# Patient Record
Sex: MALE | Race: WHITE
[De-identification: names, ages, dates, MRNs, and addresses within clinical notes are randomized per-mention and may not be internally consistent; named-entity substitution may affect disease eponyms.]

---

## 2017-04-13 ENCOUNTER — HOSPITAL ENCOUNTER (OUTPATIENT)
Dept: HOSPITAL 56 - MW.CHORTHO | Age: 47
Discharge: HOME | End: 2017-04-13
Attending: ORTHOPAEDIC SURGERY
Payer: COMMERCIAL

## 2017-04-13 DIAGNOSIS — M25.511: Primary | ICD-10-CM

## 2017-04-13 NOTE — CR
EXAMINATION: Right shoulder

 

HISTORY: Pain

 

COMPARISON: None

 

TECHNIQUE: 3 views

 

FINDINGS/IMPRESSION: There is no acute osseous abnormality, dislocation, or fracture identified. Bon
e mineralization and joint spaces appear preserved.

## 2017-04-17 ENCOUNTER — HOSPITAL ENCOUNTER (OUTPATIENT)
Dept: HOSPITAL 56 - MW.MRI | Age: 47
End: 2017-04-17
Attending: ORTHOPAEDIC SURGERY
Payer: COMMERCIAL

## 2017-04-17 DIAGNOSIS — M75.81: ICD-10-CM

## 2017-04-17 DIAGNOSIS — M25.511: Primary | ICD-10-CM

## 2017-04-17 DIAGNOSIS — S43.491A: ICD-10-CM

## 2017-04-17 DIAGNOSIS — R74.0: ICD-10-CM

## 2017-04-17 LAB
CHLORIDE SERPL-SCNC: 103 MMOL/L (ref 98–110)
SODIUM SERPL-SCNC: 138 MMOL/L (ref 136–146)

## 2017-04-18 NOTE — MR
EXAM DATE: 17



PATIENT'S AGE: 46





Patient: FLORIDA HODGE



Facility: [place], [place]

Patient ID: [id number]

Site Patient ID: [id number].

Site Accession #: [id number]

: 1970

Study: MRI Shoulder Right [id number]/17/2017 6:45:43 PM

Ordering Physician: Preet Duran



Final Report: 

HISTORY:

Right shoulder pain. Pain with shoulder movement.



Technique:

MRI right shoulder without contrast.



Comparison:

None.



Findings:

Rotator cuff: Low-grade partial-thickness articular surface tearing of the 
supraspinatus tendon footplate. Tear measures 0.7 cm in AP dimension by 0.2 cm 
in medial-lateral dimension. Distal supraspinatus and infraspinatus tendons are 
otherwise thickened with heterogeneous intermediate to near fluid intensity 
signal. There may be low-grade articular surface fraying of the supraspinatus 
tendon medial to the insertion. Low grade partial-thickness bursal surface 
tearing of the subscapularis tendon over an area measuring 0.4 x 0.4 cm. 
Moderate subscapularis tendinosis. Teres minor tendon is intact.



Rotator cuff muscle bulk and signal intensity are within normal limits.



Acromioclavicular joint and coracoacromial arch: Mild AC joint degenerative 
changes. No inferior hypertrophy. Type 1 acromial morphology. Acromiohumeral 
interval measures 7 mm. Coracoacromial ligament is thickened near the acromial 
attachment. Coracoclavicular ligament is intact.



Biceps-labral complex: Long head biceps tendon is intact. No biceps tendon 
subluxation. Tearing of the superior, posterior-superior, and posterior-
inferior labrum. Small paralabral cysts along the posterior-superior posterior 
and inferior margin of the glenoid. No cyst extension into the suprascapular 
spinoglenoid notches. Anterior-inferior labrum is intact.



Glenohumeral joint: No focal cartilage defects. No joint effusion.



Bones and soft tissues: No fracture. No marrow replacing process. No subacromial
-subdeltoid bursal effusion. Deltoid muscle bulk and signal intensity are 
normal. No abnormality in the suprascapular or spinoglenoid notches or in the 
quadrilateral space. 



Impression:

1. No high-grade partial-thickness or full-thickness rotator cuff tear. Low-
grade partial-thickness articular surface tear of the subscapularis tendon at 
the insertion. Moderate-severe tendinosis versus strain related changes of the 
supraspinatus and infraspinatus tendons. 

2. Moderate subscapularis tendinosis with low-grade partial-thickness bursal 
surface tearing. 

3. Tear of the superior, posterior-superior, and posterior inferior labrum with 
small paralabral cysts.

4. Mild AC joint degenerative changes. 

5. Intact biceps tendon.



Dictated by Mark Motta MD @ 2017 9:44AM

(Electronic Signature)



Report Signed by Proxy and Original Signed Document filed in the

Medical Record.
MTDD

## 2017-04-25 ENCOUNTER — HOSPITAL ENCOUNTER (OUTPATIENT)
Dept: HOSPITAL 56 - MW.US | Age: 47
End: 2017-04-25
Attending: PHYSICIAN ASSISTANT
Payer: COMMERCIAL

## 2017-04-25 DIAGNOSIS — R74.0: ICD-10-CM

## 2017-04-25 DIAGNOSIS — M25.461: ICD-10-CM

## 2017-04-25 DIAGNOSIS — M25.562: Primary | ICD-10-CM

## 2017-04-25 DIAGNOSIS — M76.891: ICD-10-CM

## 2017-04-25 DIAGNOSIS — K76.0: ICD-10-CM

## 2017-04-26 NOTE — US
EXAM DATE: 17



PATIENT'S AGE: 46





Patient: FLORIDA HODGE



Facility: [place], [place]

Patient ID: [id number]

Site Patient ID: [id number].

Site Accession #: [id number]

: 1970

Study: US Abdomen [id number] 9:35:42 AM

Ordering Physician: Jane Hodge



Final Report: 

INDICATION:

Elevated transaminase



TECHNIQUE:

Ultrasound abdomen limited. Sonographic images of the right upper quadrant were 
obtained using gray-scale and color Doppler images.



COMPARISON:

None 



FINDINGS:

Liver: Diffuse fatty infiltration of the liver. No masses. No intrahepatic 
biliary dilatation. 

Gallbladder: No stones or sludge. Normal wall thickness. No pericholecystic 
fluid. 

Common bile duct: 5 mm. 

Pancreas: Normal. 

Right kidney: 11.6 cm. Normal echotexture and cortex. No masses, stones, or 
hydronephrosis. 

None. 





IMPRESSION:

Diffuse fatty infiltration of the liver. 

Sonographically normal gallbladder and common bile duct.



Dictated by Matt Bill MD @ 2017 10:08AM

(Electronic Signature)



Report Signed by Proxy and Original Signed Document filed in the

Medical Record.
[facility]

## 2017-04-27 NOTE — CR
EXAM DATE: 17



PATIENT'S AGE: 46





Patient: FLORIDA HODGE



Facility: [place], [place]

Patient ID: [id number]

Site Patient ID: [id number].

Site Accession #: [id number]

: 1970

Study: XRay Knee Left [id number]/25/2017 2:52:17 PM

Ordering Physician: Jane Hodge



Final Report: 

HISTORY:

Pain.



Findings:

Standing AP and PA views of both knees were obtained with lateral an sunrise 
views of the left knee. Exam is compared with 2014. Space are 
preserved. There is mild spurring of the medial femoral condyle and lateral 
femoral condyle. Small amount of suprapatellar joint fluid is seen. No fracture 
or dislocation. 



Impression:

1. Mild degenerative changes of the medial and lateral joint spaces of the left 
knee with mild spurring of the femur.

2. Small amount of suprapatellar joint fluid without acute bony abnormality.



Dictated by Rachele Quijano MD @ 2017 10:08PM

(Electronic Signature)



Report Signed by Proxy.
ARABELLA

## 2019-02-06 ENCOUNTER — HOSPITAL ENCOUNTER (EMERGENCY)
Dept: HOSPITAL 56 - MW.ED | Age: 49
Discharge: HOME | End: 2019-02-06
Payer: COMMERCIAL

## 2019-02-06 VITALS — DIASTOLIC BLOOD PRESSURE: 80 MMHG | SYSTOLIC BLOOD PRESSURE: 107 MMHG

## 2019-02-06 DIAGNOSIS — Z88.6: ICD-10-CM

## 2019-02-06 DIAGNOSIS — J20.8: Primary | ICD-10-CM

## 2019-02-06 DIAGNOSIS — Z79.82: ICD-10-CM

## 2019-02-06 DIAGNOSIS — Z86.73: ICD-10-CM

## 2019-02-06 DIAGNOSIS — Z98.890: ICD-10-CM

## 2019-02-06 DIAGNOSIS — B97.89: ICD-10-CM

## 2019-02-06 NOTE — EDM.PDOC
ED HPI GENERAL MEDICAL PROBLEM





- General


Chief Complaint: Respiratory Problem


Stated Complaint: FEVER,COUGH


Time Seen by Provider: 02/06/19 09:36


Source of Information: Reports: Patient


History Limitations: Reports: No Limitations





- History of Present Illness


INITIAL COMMENTS - FREE TEXT/NARRATIVE: 


History of present illness:


[]Patient's had 4 days of cough, body aches, fevers, chills, congestion and 

this morning noted that he coughed up a small amount blood in the shower. 

Denies any chest pain, shortness of breath or syncope





Review of systems: 


As per history of present illness and below otherwise all systems reviewed and 

negative.





Past medical history: 


As per history of present illness and as reviewed below otherwise 

noncontributory.





Surgical history: 


As per history of present illness and as reviewed below otherwise 

noncontributory.





Social history: 


No reported history of drug or alcohol abuse.





Family history: 


As per history of present illness and as reviewed below otherwise 

noncontributory.





Physical exam:


General: Well developed, well nourished in NAD


HEENT: Atraumatic, normocephalic, pupils reactive, negative for conjunctival 

pallor or scleral icterus, mucous membranes moist, throat clear, neck supple, 

nontender, trachea midline. No stridor, TMs normal


Lungs: Clear to auscultation, breath sounds equal bilaterally, chest nontender. 

No rhonchi


Heart: S1S2, regular, negative for clicks, rubs, or JVD.


Abdomen: NABS, Soft, nondistended, nontender. Negative for masses or 

hepatosplenomegaly. Negative for costovertebral tenderness.


Pelvis: Stable nontender.


Genitourinary: Deferred.


Rectal: Deferred.


Extremities: Atraumatic, negative for cords or calf pain. Neurovascular 

unremarkable.


Neuro: Awake, alert, oriented. Cranial nerves II through XII unremarkable. 

Cerebellum unremarkable. Motor and sensory unremarkable throughout. Exam 

nonfocal.


Skin:warm and dry





Diagnostics:


Influenza chest x-ray both negative





Therapeutics:


Declined pain meds





ED Course:


Unremarkable





Impression: 


Viral bronchitis





Prescriptions:


None





Plan:


Follow-up with primary care, Motrin, Tylenol, increase fluids.





Definitive disposition and diagnosis as appropriate pending reevaluation and 

review of above.





  ** Generalized


Pain Score (Numeric/FACES): 4





- Related Data


 Allergies











Allergy/AdvReac Type Severity Reaction Status Date / Time


 


hydrocodone Allergy  Nausea Verified 02/06/19 09:25


 


oxycodone [Oxycodone] Allergy  Nausea Verified 02/06/19 09:25











Home Meds: 


 Home Meds





Aspirin 325 mg PO DAILY 04/22/14 [History]











Past Medical History


Neurological History: Reports: CVA





- Infectious Disease History


Infectious Disease History: Reports: Meningitis, Shingles





- Past Surgical History


HEENT Surgical History: Reports: Tonsillectomy


Musculoskeletal Surgical History: Reports: Arthroscopic Knee, Carpal Tunnel, 

Shoulder Surgery





Social & Family History





- Family History


Family Medical History: Noncontributory





- Tobacco Use


Years of Tobacco use: 20


Packs/Tins Daily: 1





- Caffeine Use


Caffeine Use: Reports: None





- Recreational Drug Use


Recreational Drug Use: No





ED ROS GENERAL





- Review of Systems


Review Of Systems: ROS reveals no pertinent complaints other than HPI.





ED EXAM, GENERAL





- Physical Exam


Exam: See Below (See history of present illness)





Course





- Vital Signs


Last Recorded V/S: 





 Last Vital Signs











Temp  97.9 F   02/06/19 09:21


 


Pulse  64   02/06/19 09:21


 


Resp  18   02/06/19 09:21


 


BP  130/89   02/06/19 09:21


 


Pulse Ox  96   02/06/19 09:21














Departure





- Departure


Time of Disposition: 10:43


Disposition: Home, Self-Care 01


Condition: Good


Clinical Impression: 


 Viral bronchitis








- Discharge Information


*PRESCRIPTION DRUG MONITORING PROGRAM REVIEWED*: No


*COPY OF PRESCRIPTION DRUG MONITORING REPORT IN PATIENT CUONG: No


Referrals: 


Ghassan Rosas MD [Primary Care Provider] - 


Additional Instructions: 





The following information is given to patients seen in the emergency department 

who are being discharged to home. This information is to outline your options 

for follow-up care. We provide all patients seen in our emergency department 

with a follow-up referral.





The need for follow-up, as well as the timing and circumstances, are variable 

depending upon the specifics of your emergency department visit.





If you don't have a primary care physician on staff, we will provide you with a 

referral. We always advise you to contact your personal physician following an 

emergency department visit to inform them of the circumstance of the visit and 

for follow-up with them and/or the need for any referrals to a consulting 

specialist.





The emergency department will also refer you to a specialist when appropriate. 

This referral assures that you have the opportunity for follow-up care with a 

specialist. All of these measure are taken in an effort to provide you with 

optimal care, which includes your follow-up.





Under all circumstances we always encourage you to contact your private 

physician who remains a resource for coordinating your care. When calling for 

follow-up care, please make the office aware that this follow-up is from your 

recent emergency room visit. If for any reason you are refused follow-up, 

please contact the [facility] Emergency 

Department at [phone] and asked to speak to the emergency department 

charge nurse.





[facility]


Primary Care


[address] [place] [id number]


Phone: [phone]


Fax: [phone]

## 2019-02-06 NOTE — CR
EXAMINATION: Two-view chest (PA and Lateral views).

 

HISTORY: Shortness of breath.

 

FINDINGS: 

The trachea is midline. The cardiomediastinal silhouette is within normal

limits. No pulmonary infiltrates, effusions or pneumothorax.

 

Osseous structures appear unremarkable.

 

IMPRESSION: 

No acute cardiopulmonary process.

## 2020-02-19 ENCOUNTER — HOSPITAL ENCOUNTER (OUTPATIENT)
Dept: HOSPITAL 56 - MW.SDS | Age: 50
Discharge: HOME | End: 2020-02-19
Payer: COMMERCIAL

## 2020-02-19 VITALS — DIASTOLIC BLOOD PRESSURE: 82 MMHG | SYSTOLIC BLOOD PRESSURE: 116 MMHG | HEART RATE: 46 BPM

## 2020-02-19 DIAGNOSIS — G89.18: ICD-10-CM

## 2020-02-19 DIAGNOSIS — M75.101: Primary | ICD-10-CM

## 2020-02-19 DIAGNOSIS — M65.811: ICD-10-CM

## 2020-02-19 DIAGNOSIS — E66.9: ICD-10-CM

## 2020-02-19 DIAGNOSIS — M75.51: ICD-10-CM

## 2020-02-19 DIAGNOSIS — M75.41: ICD-10-CM

## 2020-02-19 DIAGNOSIS — M94.211: ICD-10-CM

## 2020-02-19 DIAGNOSIS — Z79.82: ICD-10-CM

## 2020-02-19 DIAGNOSIS — S43.431A: ICD-10-CM

## 2020-02-19 PROCEDURE — 23410 REPAIR ROTATOR CUFF ACUTE: CPT

## 2020-02-19 PROCEDURE — 64415 NJX AA&/STRD BRCH PLXS IMG: CPT

## 2020-02-19 NOTE — PCM48HPAN
Post Anesthesia Note





- EVALUATION WITHIN 48HRS OF ANESTHETIC


Vital Signs in Normal Range: Yes


Patient Participated in Evaluation: Yes


Respiratory Function Stable: Yes


Airway Patent: Yes


Cardiovascular Function Stable: Yes


Hydration Status Stable: Yes


Pain Control Satisfactory: Yes


Nausea and Vomiting Control Satisfactory: Yes


Mental Status Recovered: Yes


Vital Signs: 


 Last Vital Signs











Temp  36.0 C L  02/19/20 15:20


 


Pulse  59 L  02/19/20 15:33


 


Resp  13   02/19/20 15:33


 


BP  114/81   02/19/20 15:33


 


Pulse Ox  97   02/19/20 15:33














- COMMENTS/OBSERVATIONS


Free Text/Narrative:: 





No anesthesia problems.

## 2020-02-19 NOTE — PCM.SN
- Free Text/Narrative


Note: 





Procedure note: Right interscalene brachial plexus block for postoperative pain 

control. The brachial plexus easily identified under sterile conditions using 

Stimuplex needle. Mixture of 20 cc of 0.5 Bupivacaine and 10 cc of 2% lidocaine 

injected. Excellent block achieved. Patient tolerated procedure well.

## 2020-02-19 NOTE — PCM.POSTAN
POST ANESTHESIA ASSESSMENT





- MENTAL STATUS


Mental Status: Alert, Oriented





- VITAL SIGNS


Vital Signs: 


 Last Vital Signs











Temp  35.7 C L  02/19/20 15:04


 


Pulse  59 L  02/19/20 15:33


 


Resp  13   02/19/20 15:33


 


BP  114/81   02/19/20 15:33


 


Pulse Ox  97   02/19/20 15:33














- RESPIRATORY


Respiratory Status: Respiratory Rate WNL, Airway Patent, O2 Saturation Stable





- CARDIOVASCULAR


CV Status: Pulse Rate WNL, Blood Pressure Stable





- GASTROINTESTINAL


GI Status: No Symptoms





- PAIN


Pain Score: 4





- POST OP HYDRATION


Hydration Status: Adequate & Stable





- OBSERVATIONS


Free Text/Narrative:: 





No anesthesia problems.

## 2020-02-19 NOTE — PCM.PREANE
Preanesthetic Assessment





- Anesthesia/Transfusion/Family Hx


Anesthesia History: Prior Anesthesia Without Reaction


Other Type of Anesthesia Reaction Comment: Spouse denies any known problem in 

past, 'little motion sickness'


Family History of Anesthesia Reaction: No


Transfusion History: No Prior Transfusion(s)


Intubation History: Unknown





- Review of Systems


General: No Symptoms


Pulmonary: No Symptoms


Cardiovascular: No Symptoms


Gastrointestinal: No Symptoms


Neurological: No Symptoms


Other: Reports: None





- Physical Assessment


Vital Signs: 





 Last Vital Signs











Temp  35.8 C L  02/19/20 11:50


 


Pulse  42 L  02/19/20 11:50


 


Resp  15   02/19/20 11:50


 


BP  125/87   02/19/20 11:50


 


Pulse Ox  99   02/19/20 11:50











Height: 6 ft


Weight: 99.79 kg


ASA Class: 2


Mental Status: Alert & Oriented x3


Airway Class: Mallampati = 2


Dentition: Reports: Normal Dentition


Thyro-Mental Finger Breadths: 3


Mouth Opening Finger Breadths: 2


ROM/Head Extension: Full


Lungs: Clear to Auscultation, Normal Respiratory Effort


Cardiovascular: Regular Rate, Regular Rhythm





- Allergies


Allergies/Adverse Reactions: 


 Allergies











Allergy/AdvReac Type Severity Reaction Status Date / Time


 


No Known Allergies Allergy   Verified 02/14/20 12:27














- Blood


Blood Available: No





- Anesthesia Plan


Pre-Op Medication Ordered: None





- Acknowledgements


Anesthesia Type Planned: General Anesthesia (interscalene brachial plexus block 

for postoperative pain control)


Pt an Appropriate Candidate for the Planned Anesthesia: Yes


Alternatives and Risks of Anesthesia Discussed w Pt/Guardian: Yes


Pt/Guardian Understands and Agrees with Anesthesia Plan: Yes





PreAnesthesia Questionnaire


HEENT History: Reports: Other (See Below)


Other HEENT History: wears glasses


Respiratory History: Reports: Sleep Apnea


Other Respiratory History: was just tested for sleep apnea and has not received 

CPAP yet


Gastrointestinal History: Reports: GERD


Other Gastrointestinal History: takes OTC medications (rolaids)


Musculoskeletal History: Reports: Arthritis, Back Pain, Chronic, Fracture


Other Musculoskeletal History: hx of fx ribs and left radius


Neurological History: Reports: Concussion, CVA, Migraines


Other Neuro History: had a stroke in 2010 from unknown causes- rt. side 

weakness plus speech ,no residual,  takes daily aspirin


Hematologic History: Reports: Anticoagulation Therapy


Other Hematologic History: takes Aspirin 325mg daily


Oncologic (Cancer) History: 


Dermatologic History: Reports: Other (See Below)


Other Dermatologic History: recent rash on elbows





- Infectious Disease History


Infectious Disease History: Reports: Meningitis, Shingles





- Past Surgical History


Head Surgeries/Procedures: Reports: None


HEENT Surgical History: Reports: Tonsillectomy


Male  Surgical History: Reports: Vasectomy


Musculoskeletal Surgical History: Reports: Arthroscopic Knee, Shoulder Surgery


Other Musculoskeletal Surgeries/Procedures:: hx of 2 left knee arthroscopies 

and right knee arthroscopy,  hx of dislocated right shoulder '97





- SUBSTANCE USE


Tobacco Use Within Last Twelve Months: Snuff/Dip


Recreational Drug Use History: No





- HOME MEDS


Home Medications: 


 Home Meds





Aspirin 325 mg PO DAILY 04/22/14 [History]


Ondansetron HCl [Zofran] 8 mg PO TID #15 tablet 02/19/20 [Rx]


oxyCODONE HCl/Acetaminophen [Percocet 5-325 mg Tablet] 1 each PO Q6HR #28 

tablet 02/19/20 [Rx]











- CURRENT (IN HOUSE) MEDS


Current Meds: 





 Current Medications





Cefazolin Sodium/Dextrose 2 gm (/ Premix)  50 mls @ 100 mls/hr IV ONCALL URI


Lactated Ringer's (Ringers, Lactated)  1,000 mls @ 100 mls/hr IV ASDIRECTED RUI





Discontinued Medications





Bupivacaine HCl (Sensorcaine-Mpf 0.5%) Confirm Administered Dose 10 ml .ROUTE 

.STK-MED ONE


   Stop: 02/19/20 11:39


Fentanyl (Sublimaze) Confirm Administered Dose 100 mcg .ROUTE .STK-MED ONE


   Stop: 02/19/20 12:04


Lidocaine (Xylocaine-Mpf 2%) Confirm Administered Dose 5 ml .ROUTE .STK-MED ONE


   Stop: 02/19/20 12:35


Midazolam HCl (Versed 1 Mg/Ml) Confirm Administered Dose 2 mg .ROUTE .STK-MED 

ONE


   Stop: 02/19/20 12:04


Propofol (Diprivan  20 Ml) Confirm Administered Dose 200 mg .ROUTE .STK-MED ONE


   Stop: 02/19/20 12:35


Propofol (Diprivan  20 Ml) Confirm Administered Dose 200 mg .ROUTE .STK-MED ONE


   Stop: 02/19/20 12:40


Rocuronium Bromide (Zemuron) Confirm Administered Dose 100 mg .ROUTE .STK-MED 

ONE


   Stop: 02/19/20 12:35

## 2020-02-20 NOTE — PCM.OPNOTE
- General Post-Op/Procedure Note


Date of Surgery/Procedure: 02/19/20


Operative Procedure(s): right shoulder arthroscopy.  subacromial decompression.

  labral debridement.  rotator cuff repair


Pre Op Diagnosis: right shoulder rotator cuff tear.  right shoulder 

impingement.  right glenoid labral tear


Post-Op Diagnosis: Same


Anesthesia Technique: General ET Tube


Primary Surgeon: Wilfredo Bernard


Assistant: Patricia Del Real


EBL in mLs: 25


Complications: None


Condition: Stable

## 2020-02-20 NOTE — OR
SURGEON:

Wilfredo Bernard

 

DATE OF PROCEDURE:  02/19/2020

 

PREOPERATIVE DIAGNOSIS:

Right shoulder rotator cuff tear, right shoulder impingement, right glenoid

labral tear.

 

POSTOPERATIVE DIAGNOSIS:

Right shoulder rotator cuff tear, right shoulder impingement, right glenoid

labral tear.

 

PROCEDURE:

Right shoulder arthroscopy, right shoulder subacromial decompression, labral

debridement, and open rotator cuff repair.

 

ASSISTANT:

JADYN Shaw

 

ROLE OF ASSISTANT:

Nurse practitioner, JADYN Shaw, played an essential role in assisting in

this case, helping to position the patient, retract structures as needed, as

well as suturing and cutting sutures as indicated. Her presence improved

patient's safety and decreased operative time.

 

ANESTHESIA:

General endotracheal intubation.

 

FLUIDS:

Lactated Ringer's solution.

 

ESTIMATED BLOOD LOSS:

25 mL.

 

COMPLICATIONS:

None.

 

SPECIMEN:

None.

 

DISCHARGE DISPOSITION:

Stable to PACU.

 

HISTORY AND INDICATION FOR THE PROCEDURE:

The patient was seen preoperatively in the clinic.  He had failed nonoperative

treatment.  Preoperative imaging confirmed the above-mentioned diagnosis.  Risks

and goals of the procedure were explained to the patient and informed consent

was obtained.

 

DETAILS OF THE PROCEDURE:

The patient was seen preoperatively by me and the Anesthesia staff in the

preoperative holding area where the operative site was marked.  He had an

interscalene block performed preoperatively in the preoperative holding area by

the anesthesia staff.  He was brought to the operative suite by Anesthesia staff

where general endotracheal intubation was performed.  He was placed in the beach

chair position.  All extremities were found to be well padded.  The right upper

extremity was then prepped and draped in sterile manner.  A time-out was called

identifying the correct patient, the correct site, and then antibiotics had been

given within appropriate period of time.

 

A posterior portal was first made with a knife and then trocar was entered and

then the camera was placed in.  There was extensive labral fraying present.

There was moderate chondromalacia of both the humeral and the glenoid side.

There was an extensive synovitis present.  Partial synovectomy was performed.

An anterior portal was then made with the spinal needle knife and trocar and

then a shaver was used to enter and debride the glenoid labrum and then an

ablation unit was used to stabilize the edges of the labrum and then the

synovectomy was performed with both the shaver and the ablation unit.  After

this had been cleaned up nicely, we then removed our instruments and then

reinserted the trocar into the subacromial space from the posterior aspect and

then made a lateral incision and inserted the shaver.  An extensive bursitis was

present.  We performed an extensive bursectomy.  I then visualized the acromion,

which was at least type 3.  I then used the ablation unit to delineate the edges

anteriorly.  We then used a bur through the lateral portal to perform a

subacromial decompression.  After this had been accomplished, we then removed

our instruments and then I re-prepped with ChloraPrep, and then made a saber

incision lateral to the distal acromion.  I went through the anterior medial

raphae of the deltoid.  The MRI had shown that there was thinning of the fibers

of the supraspinatus and I could feel this area.  It was definitely thin.  I

made a longitudinal incision through this area and then prepped the area for the

anchor.  I placed an Iconix anchor and then used a free needle and whipstitched

my sutures through the good portion of the tendon and then back to the anchor

and then tied it.  I then placed another anchor, which unfortunately broke off

and then we placed another distal anchor and then a knotless anchor, and then

ran our sutures through this anchor and then tightened them up and then cut the

sutures.  Having accomplished our goals, we then copiously irrigated with

Betadine infused irrigation.  My assist, closed the deltoid in a watertight

manner with #1 Stratafix and then closed subcutaneously with #1 Stratafix

followed by skin staples and our portals were closed with staples as well.

Incisions were covered with Betadine-soaked Adaptic, fluffs, and Medipore tape.

The patient was allowed to awaken from general anesthesia and taken to the PACU

in stable condition.

 

 

[id number] / MODL

DD:  02/20/2020 16:47:41

DT:  02/20/2020 22:43:05

Job #:  [id number]

## 2020-06-24 ENCOUNTER — HOSPITAL ENCOUNTER (OUTPATIENT)
Dept: HOSPITAL 56 - MW.SDS | Age: 50
Discharge: HOME | End: 2020-06-24
Payer: COMMERCIAL

## 2020-06-24 VITALS — HEART RATE: 63 BPM | SYSTOLIC BLOOD PRESSURE: 104 MMHG | DIASTOLIC BLOOD PRESSURE: 81 MMHG

## 2020-06-24 DIAGNOSIS — Z79.899: ICD-10-CM
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
## 2020-06-24 DIAGNOSIS — E66.9: ICD-10-CM

## 2020-06-24 DIAGNOSIS — S62.336A: Primary | ICD-10-CM

## 2020-06-24 DIAGNOSIS — F17.220: ICD-10-CM

## 2020-06-24 DIAGNOSIS — Z11.59: ICD-10-CM

## 2020-06-24 PROCEDURE — 87635 SARS-COV-2 COVID-19 AMP PRB: CPT

## 2020-06-24 PROCEDURE — U0002 COVID-19 LAB TEST NON-CDC: HCPCS

## 2020-06-24 PROCEDURE — 76000 FLUOROSCOPY <1 HR PHYS/QHP: CPT

## 2020-06-24 PROCEDURE — 26615 TREAT METACARPAL FRACTURE: CPT

## 2020-06-24 PROCEDURE — C1713 ANCHOR/SCREW BN/BN,TIS/BN: HCPCS

## 2020-06-24 NOTE — CR
Right hand: 3 fluoroscopic spot views were obtained of the right hand 

utilizing C-arm device.

 

Comparison: Prior right hand exam of 06/18/20.

 

Previous distal right 5th metacarpal fracture shows evidence of 

reduction and fixation with plate and screws.  No additional 

abnormality is seen.

 

Fluoroscopy time given as 7.7 seconds.

 

Impression:

1.  Procedural study as described above.

 

Diagnostic code #2

 

This report was dictated in MDT

## 2020-06-24 NOTE — PCM.PREANE
Preanesthetic Assessment





- Anesthesia/Transfusion/Family Hx


Anesthesia History: Prior Anesthesia Without Reaction


Other Type of Anesthesia Reaction Comment: Spouse denies any known problem in 

past, 'little motion sickness'


Family History of Anesthesia Reaction: No


Transfusion History: No Prior Transfusion(s)


Intubation History: Unknown





- Review of Systems


General: No Symptoms


Pulmonary: No Symptoms


Cardiovascular: No Symptoms


Gastrointestinal: No Symptoms


Neurological: No Symptoms


Other: Reports: None





- Physical Assessment


Height: 6 ft


Weight: 102.058 kg


ASA Class: 2


Mental Status: Alert & Oriented x3


Airway Class: Mallampati = 2


Dentition: Reports: Normal Dentition


Thyro-Mental Finger Breadths: 3


Mouth Opening Finger Breadths: 2 (smaller mouth)


ROM/Head Extension: Full


Lungs: Clear to Auscultation, Normal Respiratory Effort


Cardiovascular: Regular Rate, Regular Rhythm





- Allergies


Allergies/Adverse Reactions: 


                                    Allergies











Allergy/AdvReac Type Severity Reaction Status Date / Time


 


No Known Allergies Allergy   Verified 06/22/20 11:29














- Blood


Blood Available: No





- Anesthesia Plan


Pre-Op Medication Ordered: None





- Acknowledgements


Anesthesia Type Planned: General Anesthesia


Pt an Appropriate Candidate for the Planned Anesthesia: Yes


Alternatives and Risks of Anesthesia Discussed w Pt/Guardian: Yes


Pt/Guardian Understands and Agrees with Anesthesia Plan: Yes





PreAnesthesia Questionnaire


HEENT History: Reports: Other (See Below)


Other HEENT History: wears glasses


Respiratory History: Reports: Sleep Apnea


Other Respiratory History: was just tested for sleep apnea and has not received 

CPAP yet


Gastrointestinal History: Reports: GERD


Other Gastrointestinal History: takes OTC medications (rolaids)


Musculoskeletal History: Reports: Arthritis, Back Pain, Chronic, Fracture (5th 

rt. 5th metacarpal bone fx.)


Other Musculoskeletal History: hx of fx ribs and left radius


Neurological History: Reports: Concussion, CVA, Migraines


Other Neuro History: had a stroke in 2010 from unknown causes- rt. side weakness

plus speech ,no residual,  takes daily aspirin


Endocrine/Metabolic History: Reports: Obesity/BMI 30+ (BMI 30.5)


Hematologic History: Reports: Anticoagulation Therapy


Other Hematologic History: takes Aspirin 325mg daily


Oncologic (Cancer) History: 


Dermatologic History: Reports: Other (See Below)


Other Dermatologic History: recent rash on elbows





- Infectious Disease History


Infectious Disease History: Reports: Meningitis, Shingles





- Past Surgical History


HEENT Surgical History: Reports: Tonsillectomy


Male  Surgical History: Reports: Vasectomy


Musculoskeletal Surgical History: Reports: Arthroscopic Knee, Shoulder Surgery


Other Musculoskeletal Surgeries/Procedures:: hx of 2 left knee arthroscopies and

right knee arthroscopy,  hx of dislocated right shoulder '97





- SUBSTANCE USE


Smoking Status *Q: Current Every Day Smoker


Tobacco Use Within Last Twelve Months: Snuff/Dip


Recreational Drug Use History: No





- HOME MEDS


Home Medications: 


                                    Home Meds





Aspirin 325 mg PO DAILY 04/22/14 [History]











- CURRENT (IN HOUSE) MEDS


Current Meds: 





                               Current Medications





Lactated Ringer's (Ringers, Lactated)  1,000 mls @ 100 mls/hr IV ASDIRECTED RUI


Cefazolin Sodium/Dextrose 2 gm (/ Premix)  50 mls @ 100 mls/hr IV ONCALL RUI
Shilo Blankenship

## 2020-06-24 NOTE — OR
SURGEON:

Wilfredo Bernard

 

DATE OF PROCEDURE:  06/24/2020

 

PREOPERATIVE DIAGNOSIS:

Right 5th metacarpal neck fracture, closed.

 

POSTOPERATIVE DIAGNOSIS:

Right 5th metacarpal neck fracture, closed.

 

PROCEDURES:

1. Right 5th metacarpal open reduction and internal fixation.

2. Application of short-arm splint.

 

PRIMARY SURGEON:

Wilfredo Bernard DO

 

ASSISTANT:

JADYN Shaw

 

ROLE OF ASSISTANT:

Nurse practitioner, JADYN Shaw, played an essential role in assisting in

this case, helping to position the patient, retract structures as needed, as

well as suturing and cutting sutures as indicated.  Her presence improved

patient's safety and decreased operative time.

 

ANESTHESIA:

General endotracheal intubation.

 

FLUID:

Lactated Ringer's solution.

 

ESTIMATED BLOOD LOSS:

25 mL.

 

COMPLICATIONS:

None.

 

SPECIMEN:

None.

 

DISCHARGE DISPOSITION:

Stable to PACU.

 

HISTORY AND INDICATIONS FOR THE PROCEDURE:

The patient is well known to me.  He hurt his right hand about a week and a half

to two weeks ago.  He was seen in clinic.  He was found to have the above-

mentioned fracture.  Risks and goals of the procedure were explained to the

patient and informed consent was obtained.

 

DETAILS OF PROCEDURE:

The patient was seen preoperatively by myself and the Anesthesia staff in the

preoperative holding area where the operative site was marked.  He was brought

to the operative suite by the Anesthesia staff where general anesthesia was

administered.  A well-padded tourniquet was placed on the right arm.  The right

upper extremity was then prepped and draped in a sterile manner.  Time-out was

called identifying the correct patient, the correct procedure, the correct site,

and that antibiotics had been given within appropriate period of time.

 

The right upper extremity was exsanguinated, tourniquet was raised to 200 mmHg

and taken down to the end of the case.  An incision was made just distal to the

metacarpophalangeal joint extending proximally about 5 cm.  This was taken down

to the tendon.  The tendon was moved ulnarly.  I cut through one of the

juncturae tendinae in order to separate this.  I went down on the bone.  The

fracture line was visualized.  It was definitely in about 25 degrees of flexion.

I used a K-wire to hold this in position in extension.  There were ulnar and

radial fragments.  I did place traction on this, but these did not reduce, and

it was unclear as to where they were keying in at.  I placed my distal screws

with an L-plate, which provided some traction to get it at the length, and then

kept the neck in proper angulation and then placed one of my nonlocking screws

through the plate proximally.  I then placed one locking screw and the remainder

were locking screws.  I then took my final AP, lateral, and oblique films.  I

then copiously irrigated with saline.  I then repaired the juncturae tendinae

and closed subcutaneously with 2-0 Vicryl interrupted sutures followed by 3-0

nylon horizontal mattress sutures.  I did use Bovie electrocautery for

hemostasis.  After this had been accomplished, my first assist then applied an

ulnar gutter splint with plaster.  The patient was then allowed to awaken from

general anesthesia and taken to the PACU in stable condition.

 

 

[id number] / MODL

DD:  06/24/2020 14:09:20

DT:  06/24/2020 14:55:05

Job #:  [id number]

## 2020-06-24 NOTE — PCM.POSTAN
POST ANESTHESIA ASSESSMENT





- MENTAL STATUS


Mental Status: Alert, Oriented





- VITAL SIGNS


Vital Signs: 


                                Last Vital Signs











Temp  36.2 C   06/24/20 14:19


 


Pulse  67   06/24/20 14:46


 


Resp  14   06/24/20 14:46


 


BP  131/78   06/24/20 14:46


 


Pulse Ox  94 L  06/24/20 14:46














- RESPIRATORY


Respiratory Status: Respiratory Rate WNL, Airway Patent, O2 Saturation Stable





- CARDIOVASCULAR


CV Status: Pulse Rate WNL, Blood Pressure Stable





- GASTROINTESTINAL


GI Status: No Symptoms





- PAIN


Pain Score: 4





- POST OP HYDRATION


Hydration Status: Adequate & Stable





- OBSERVATIONS


Free Text/Narrative:: 





No anesthesia problems

## 2020-06-24 NOTE — PCM.OPNOTE
- General Post-Op/Procedure Note


Date of Surgery/Procedure: 06/24/20


Operative Procedure(s): orif right 5th metacarpal neck


Pre Op Diagnosis: right fifth metacarpal neck fracture, closed


Post-Op Diagnosis: Same


Anesthesia Technique: General ET Tube


Primary Surgeon: Wilfredo Bernard


EBL in mLs: 25


Complications: None


Condition: Good

## 2022-06-01 ENCOUNTER — HOSPITAL ENCOUNTER (OUTPATIENT)
Dept: HOSPITAL 56 - MW.SDS | Age: 52
Discharge: HOME | End: 2022-06-01
Attending: SURGERY
Payer: COMMERCIAL

## 2022-06-01 VITALS — HEART RATE: 59 BPM | DIASTOLIC BLOOD PRESSURE: 77 MMHG | SYSTOLIC BLOOD PRESSURE: 116 MMHG

## 2022-06-01 DIAGNOSIS — Z79.899: ICD-10-CM

## 2022-06-01 DIAGNOSIS — Z98.890: ICD-10-CM

## 2022-06-01 DIAGNOSIS — K21.9: ICD-10-CM

## 2022-06-01 DIAGNOSIS — F17.220: ICD-10-CM

## 2022-06-01 DIAGNOSIS — K57.30: ICD-10-CM

## 2022-06-01 DIAGNOSIS — Z79.82: ICD-10-CM

## 2022-06-01 DIAGNOSIS — E66.9: ICD-10-CM

## 2022-06-01 DIAGNOSIS — Z80.0: ICD-10-CM

## 2022-06-01 DIAGNOSIS — I10: ICD-10-CM

## 2022-06-01 DIAGNOSIS — Z12.11: Primary | ICD-10-CM

## 2022-06-01 DIAGNOSIS — K29.50: ICD-10-CM

## 2023-03-01 ENCOUNTER — HOSPITAL ENCOUNTER (OUTPATIENT)
Dept: HOSPITAL 56 - MW.SDS | Age: 53
Discharge: HOME | End: 2023-03-01
Attending: ORTHOPAEDIC SURGERY
Payer: COMMERCIAL

## 2023-03-01 VITALS — HEART RATE: 55 BPM | SYSTOLIC BLOOD PRESSURE: 133 MMHG | DIASTOLIC BLOOD PRESSURE: 84 MMHG

## 2023-03-01 DIAGNOSIS — I10: ICD-10-CM

## 2023-03-01 DIAGNOSIS — Z79.899: ICD-10-CM

## 2023-03-01 DIAGNOSIS — G47.33: ICD-10-CM

## 2023-03-01 DIAGNOSIS — K21.9: ICD-10-CM

## 2023-03-01 DIAGNOSIS — F51.04: ICD-10-CM

## 2023-03-01 DIAGNOSIS — M17.12: ICD-10-CM

## 2023-03-01 DIAGNOSIS — G56.01: Primary | ICD-10-CM

## 2023-03-01 PROCEDURE — 64721 CARPAL TUNNEL SURGERY: CPT

## 2023-06-12 ENCOUNTER — HOSPITAL ENCOUNTER (OUTPATIENT)
Dept: HOSPITAL 56 - MW.ED | Age: 53
Setting detail: OBSERVATION
LOS: 1 days | Discharge: HOME | End: 2023-06-13
Attending: INTERNAL MEDICINE | Admitting: INTERNAL MEDICINE
Payer: COMMERCIAL

## 2023-06-12 DIAGNOSIS — Z79.01: ICD-10-CM

## 2023-06-12 DIAGNOSIS — Z86.718: ICD-10-CM

## 2023-06-12 DIAGNOSIS — I10: Primary | ICD-10-CM

## 2023-06-12 DIAGNOSIS — R47.01: ICD-10-CM

## 2023-06-12 DIAGNOSIS — G43.909: ICD-10-CM

## 2023-06-12 DIAGNOSIS — Z79.82: ICD-10-CM

## 2023-06-12 DIAGNOSIS — Z86.73: ICD-10-CM

## 2023-06-12 DIAGNOSIS — Z98.890: ICD-10-CM

## 2023-06-12 DIAGNOSIS — I63.9: ICD-10-CM

## 2023-06-12 DIAGNOSIS — Z79.899: ICD-10-CM

## 2023-06-12 DIAGNOSIS — E66.9: ICD-10-CM

## 2023-06-12 DIAGNOSIS — G47.30: ICD-10-CM

## 2023-06-12 LAB
ALBUMIN SERPL-MCNC: 3.9 G/DL (ref 3.4–5)
ALBUMIN/GLOB SERPL: 1 {RATIO} (ref 0.9–1.6)
ALP SERPL-CCNC: 63 U/L (ref 46–116)
ALT SERPL-CCNC: 146 IU/L (ref 14–63)
APPEARANCE UR: CLEAR
APTT PPP: 26.1 SEC (ref 23.9–30.7)
AST SERPL-CCNC: 61 IU/L (ref 15–37)
BASOPHILS # BLD AUTO: 0 K/UL (ref 0–0.1)
BASOPHILS NFR BLD AUTO: 0.3 % (ref 0–1.5)
BILIRUB SERPL-MCNC: 0.5 MG/DL (ref 0.2–1)
BILIRUB UR STRIP-MCNC: NEGATIVE MG/DL
BUN SERPL-MCNC: 29 MG/DL (ref 7–18)
BUN SERPL-MCNC: 29 MG/DL (ref 7–18)
CALCIUM SERPL-MCNC: 9.2 MG/DL (ref 8.5–10.1)
CALCIUM SERPL-MCNC: 9.5 MG/DL (ref 8.5–10.1)
CHLORIDE SERPL-SCNC: 102 MMOL/L (ref 98–107)
CHLORIDE SERPL-SCNC: 102 MMOL/L (ref 98–107)
CHOLEST SERPL-MCNC: 157 MG/DL (ref 50–200)
CO2 SERPL-SCNC: 24.2 MMOL/L (ref 21–32)
CO2 SERPL-SCNC: 29.1 MMOL/L (ref 21–32)
COLOR UR: YELLOW
CREAT CL 24H UR+SERPL-VRATE: 73.79 ML/MIN
CREAT CL 24H UR+SERPL-VRATE: 79.94 ML/MIN
CREAT SERPL-MCNC: 1.2 MG/DL (ref 0.8–1.3)
CREAT SERPL-MCNC: 1.3 MG/DL (ref 0.8–1.3)
EGFRCR SERPLBLD CKD-EPI 2021: 67 ML/MIN (ref 60–?)
EGFRCR SERPLBLD CKD-EPI 2021: 73 ML/MIN (ref 60–?)
EOSINOPHIL # BLD AUTO: 0.1 K/UL (ref 0–0.7)
EOSINOPHIL NFR BLD AUTO: 1.6 % (ref 0–7)
GLOBULIN SER-MCNC: 3.8 G/DL (ref 2.6–4)
GLUCOSE SERPL-MCNC: 103 MG/DL (ref 74–106)
GLUCOSE SERPL-MCNC: 105 MG/DL (ref 74–106)
GLUCOSE UR STRIP-MCNC: NEGATIVE MG/DL
HBA1C BLD-MCNC: 5.7 %
HCT VFR BLD AUTO: 45.3 % (ref 38–50)
HDLC SERPL-MCNC: 11 MG/DL (ref 5–55)
HDLC SERPL-MCNC: 59 MG/DL (ref 40–60)
HGB BLD-MCNC: 15.6 G/DL (ref 13–17)
INR PPP: 0.94 (ref 0.86–1.11)
KETONES UR STRIP-MCNC: NEGATIVE MG/DL
LDLC SERPL CALC-MCNC: 87 MG/DL (ref 60–180)
LYMPHOCYTES # BLD AUTO: 2.5 K/UL (ref 0.6–2.4)
LYMPHOCYTES NFR BLD AUTO: 33.7 % (ref 16–40)
MAGNESIUM SERPL-MCNC: 2.1 MG/DL (ref 1.8–2.4)
MCH RBC QN AUTO: 32.4 PG (ref 27–32)
MCHC RBC AUTO-ENTMCNC: 34.4 G/DL (ref 31–37)
MCHC RBC AUTO-ENTMCNC: 94.2 FL (ref 80–98)
MONOCYTES # BLD AUTO: 1 K/UL (ref 0–0.8)
MONOCYTES NFR BLD AUTO: 13.7 % (ref 0–15)
NEUTROPHILS # BLD AUTO: 3.7 K/UL (ref 1.4–5.7)
NEUTROPHILS NFR BLD AUTO: 50.7 % (ref 48–80)
NITRITE UR QL: NEGATIVE
NRBC BLD AUTO-RTO: 0 /100WBC
NRBC BLD AUTO-RTO: 0 K/UL
PH UR STRIP: 6 [PH] (ref 5–8)
PLATELET # BLD AUTO: 260 K/UL (ref 150–400)
PMV BLD AUTO: 10.3 FL (ref 7.4–12)
POTASSIUM SERPL-SCNC: 3.9 MMOL/L (ref 3.5–5.1)
POTASSIUM SERPL-SCNC: 4 MMOL/L (ref 3.5–5.1)
PROT SERPL-MCNC: 7.7 G/DL (ref 6.4–8.2)
PROT UR STRIP-MCNC: NEGATIVE MG/DL
RBC # BLD AUTO: 4.81 M/UL (ref 4.5–5.9)
RBC UR QL: NEGATIVE
SODIUM SERPL-SCNC: 140 MMOL/L (ref 136–148)
SODIUM SERPL-SCNC: 140 MMOL/L (ref 136–148)
SP GR UR STRIP: <= 1.005 (ref 1–1.03)
TRIGL SERPL-MCNC: 55 MG/DL (ref 0–200)
TSH SERPL DL<=0.005 MIU/L-ACNC: 2.84 UIU/ML (ref 0.36–3.74)
UROBILINOGEN UR STRIP-ACNC: 0.2 EU/DL (ref ?–2)
VIT B12 SERPL-MCNC: 531 PG/ML (ref 193–986)
WBC # BLD AUTO: 7.36 K/UL (ref 4–11)

## 2023-06-12 PROCEDURE — 84443 ASSAY THYROID STIM HORMONE: CPT

## 2023-06-12 PROCEDURE — 36415 COLL VENOUS BLD VENIPUNCTURE: CPT

## 2023-06-12 PROCEDURE — 85730 THROMBOPLASTIN TIME PARTIAL: CPT

## 2023-06-12 PROCEDURE — 85025 COMPLETE CBC W/AUTO DIFF WBC: CPT

## 2023-06-12 PROCEDURE — 81003 URINALYSIS AUTO W/O SCOPE: CPT

## 2023-06-12 PROCEDURE — 70450 CT HEAD/BRAIN W/O DYE: CPT

## 2023-06-12 PROCEDURE — 93306 TTE W/DOPPLER COMPLETE: CPT

## 2023-06-12 PROCEDURE — 85610 PROTHROMBIN TIME: CPT

## 2023-06-12 PROCEDURE — 86900 BLOOD TYPING SEROLOGIC ABO: CPT

## 2023-06-12 PROCEDURE — 83735 ASSAY OF MAGNESIUM: CPT

## 2023-06-12 PROCEDURE — 93970 EXTREMITY STUDY: CPT

## 2023-06-12 PROCEDURE — A9577 INJ MULTIHANCE: HCPCS

## 2023-06-12 PROCEDURE — 96374 THER/PROPH/DIAG INJ IV PUSH: CPT

## 2023-06-12 PROCEDURE — 80061 LIPID PANEL: CPT

## 2023-06-12 PROCEDURE — 70551 MRI BRAIN STEM W/O DYE: CPT

## 2023-06-12 PROCEDURE — 82947 ASSAY GLUCOSE BLOOD QUANT: CPT

## 2023-06-12 PROCEDURE — 70496 CT ANGIOGRAPHY HEAD: CPT

## 2023-06-12 PROCEDURE — 86850 RBC ANTIBODY SCREEN: CPT

## 2023-06-12 PROCEDURE — 96361 HYDRATE IV INFUSION ADD-ON: CPT

## 2023-06-12 PROCEDURE — 82607 VITAMIN B-12: CPT

## 2023-06-12 PROCEDURE — 84484 ASSAY OF TROPONIN QUANT: CPT

## 2023-06-12 PROCEDURE — 96375 TX/PRO/DX INJ NEW DRUG ADDON: CPT

## 2023-06-12 PROCEDURE — 83036 HEMOGLOBIN GLYCOSYLATED A1C: CPT

## 2023-06-12 PROCEDURE — 70498 CT ANGIOGRAPHY NECK: CPT

## 2023-06-12 PROCEDURE — G0378 HOSPITAL OBSERVATION PER HR: HCPCS

## 2023-06-12 PROCEDURE — 80048 BASIC METABOLIC PNL TOTAL CA: CPT

## 2023-06-12 PROCEDURE — 86901 BLOOD TYPING SEROLOGIC RH(D): CPT

## 2023-06-12 PROCEDURE — 93005 ELECTROCARDIOGRAM TRACING: CPT

## 2023-06-12 PROCEDURE — 80053 COMPREHEN METABOLIC PANEL: CPT

## 2023-06-12 PROCEDURE — 99291 CRITICAL CARE FIRST HOUR: CPT

## 2023-06-13 VITALS — SYSTOLIC BLOOD PRESSURE: 104 MMHG | DIASTOLIC BLOOD PRESSURE: 67 MMHG | HEART RATE: 54 BPM

## 2023-06-13 LAB
BASOPHILS # BLD AUTO: 0 K/UL (ref 0–0.1)
BASOPHILS NFR BLD AUTO: 0.3 % (ref 0–1.5)
BUN SERPL-MCNC: 20 MG/DL (ref 7–18)
CALCIUM SERPL-MCNC: 8.8 MG/DL (ref 8.5–10.1)
CHLORIDE SERPL-SCNC: 103 MMOL/L (ref 98–107)
CO2 SERPL-SCNC: 27.7 MMOL/L (ref 21–32)
CREAT CL 24H UR+SERPL-VRATE: 73.79 ML/MIN
CREAT SERPL-MCNC: 1.3 MG/DL (ref 0.8–1.3)
EGFRCR SERPLBLD CKD-EPI 2021: 67 ML/MIN (ref 60–?)
EOSINOPHIL # BLD AUTO: 0.1 K/UL (ref 0–0.7)
EOSINOPHIL NFR BLD AUTO: 1.7 % (ref 0–7)
GLUCOSE SERPL-MCNC: 98 MG/DL (ref 74–106)
HCT VFR BLD AUTO: 43.6 % (ref 38–50)
HGB BLD-MCNC: 14.8 G/DL (ref 13–17)
LYMPHOCYTES # BLD AUTO: 2 K/UL (ref 0.6–2.4)
LYMPHOCYTES NFR BLD AUTO: 31.1 % (ref 16–40)
MCH RBC QN AUTO: 32 PG (ref 27–32)
MCHC RBC AUTO-ENTMCNC: 33.9 G/DL (ref 31–37)
MCHC RBC AUTO-ENTMCNC: 94.2 FL (ref 80–98)
MONOCYTES # BLD AUTO: 0.9 K/UL (ref 0–0.8)
MONOCYTES NFR BLD AUTO: 13.3 % (ref 0–15)
NEUTROPHILS # BLD AUTO: 3.4 K/UL (ref 1.4–5.7)
NEUTROPHILS NFR BLD AUTO: 53.6 % (ref 48–80)
NRBC BLD AUTO-RTO: 0 /100WBC
NRBC BLD AUTO-RTO: 0 K/UL
PLATELET # BLD AUTO: 237 K/UL (ref 150–400)
PMV BLD AUTO: 10.4 FL (ref 7.4–12)
POTASSIUM SERPL-SCNC: 3.9 MMOL/L (ref 3.5–5.1)
RBC # BLD AUTO: 4.63 M/UL (ref 4.5–5.9)
SODIUM SERPL-SCNC: 140 MMOL/L (ref 136–148)
WBC # BLD AUTO: 6.39 K/UL (ref 4–11)

## 2024-08-08 ENCOUNTER — HOSPITAL ENCOUNTER (OUTPATIENT)
Dept: HOSPITAL 56 - MW.ED | Age: 54
Setting detail: OBSERVATION
LOS: 1 days | Discharge: HOME | End: 2024-08-09
Attending: FAMILY MEDICINE | Admitting: FAMILY MEDICINE
Payer: COMMERCIAL

## 2024-08-08 DIAGNOSIS — K21.9: ICD-10-CM

## 2024-08-08 DIAGNOSIS — I10: ICD-10-CM

## 2024-08-08 DIAGNOSIS — Z79.899: ICD-10-CM

## 2024-08-08 DIAGNOSIS — G47.33: ICD-10-CM

## 2024-08-08 DIAGNOSIS — H53.2: Primary | ICD-10-CM

## 2024-08-08 DIAGNOSIS — E78.00: ICD-10-CM

## 2024-08-08 DIAGNOSIS — E66.9: ICD-10-CM

## 2024-08-08 DIAGNOSIS — Z79.82: ICD-10-CM

## 2024-08-08 LAB
ALBUMIN SERPL-MCNC: 4.3 G/DL (ref 3.4–5)
ALBUMIN/GLOB SERPL: 1.1 {RATIO} (ref 0.9–1.6)
ALP SERPL-CCNC: 79 U/L (ref 46–116)
ALT SERPL-CCNC: 124 IU/L (ref 14–63)
AST SERPL-CCNC: 50 IU/L (ref 15–37)
BASOPHILS # BLD AUTO: 0.03 K/UL (ref 0–0.2)
BASOPHILS NFR BLD AUTO: 0.3 % (ref 0–1)
BILIRUB SERPL-MCNC: 0.8 MG/DL (ref 0.2–1)
BUN SERPL-MCNC: 9 MG/DL (ref 7–18)
CALCIUM SERPL-MCNC: 9.3 MG/DL (ref 8.5–10.1)
CHLORIDE SERPL-SCNC: 100 MMOL/L (ref 98–107)
CO2 SERPL-SCNC: 30.6 MMOL/L (ref 21–32)
CREAT CL 24H UR+SERPL-VRATE: 89.22 ML/MIN
CREAT SERPL-MCNC: 1 MG/DL (ref 0.8–1.3)
EGFRCR SERPLBLD CKD-EPI 2021: 91 ML/MIN (ref 60–?)
EOSINOPHIL # BLD AUTO: 0.12 K/UL (ref 0–0.45)
EOSINOPHIL NFR BLD AUTO: 1.4 % (ref 0–6)
GLOBULIN SER-MCNC: 3.9 G/DL (ref 2.6–4)
GLUCOSE SERPL-MCNC: 93 MG/DL (ref 74–106)
HCT VFR BLD AUTO: 46.5 % (ref 42–52)
HGB BLD-MCNC: 16 G/DL (ref 14–18)
IMM GRANULOCYTES # BLD: 0.01 K/UL (ref 0–0.05)
IMM GRANULOCYTES NFR BLD: 0.1 % (ref 0–0.4)
INR PPP: 1.02 (ref 0.86–1.11)
LYMPHOCYTES # BLD AUTO: 2.65 K/UL (ref 1–4.8)
LYMPHOCYTES NFR BLD AUTO: 30.7 % (ref 24–44)
MCH RBC QN AUTO: 31.4 PG (ref 28–32)
MCHC RBC AUTO-ENTMCNC: 34.4 G/DL (ref 32–36)
MCHC RBC AUTO-ENTMCNC: 91.4 FL (ref 83–99)
MONOCYTES # BLD AUTO: 1.03 K/UL (ref 0–0.8)
MONOCYTES NFR BLD AUTO: 11.9 % (ref 0–8)
NEUTROPHILS # BLD AUTO: 4.78 K/UL (ref 1.8–7.7)
NEUTROPHILS NFR BLD AUTO: 55.6 % (ref 41–71)
NRBC BLD AUTO-RTO: 0 /100WBC (ref 0–0.2)
NRBC BLD AUTO-RTO: 0 K/UL (ref 0–0.02)
PLATELET # BLD AUTO: 258 K/UL (ref 150–400)
PMV BLD AUTO: 10.1 FL (ref 9.4–12.4)
POTASSIUM SERPL-SCNC: 3.5 MMOL/L (ref 3.5–5.1)
PROT SERPL-MCNC: 8.2 G/DL (ref 6.4–8.2)
RBC # BLD AUTO: 5.09 M/UL (ref 4.52–5.9)
SODIUM SERPL-SCNC: 140 MMOL/L (ref 136–148)
WBC # BLD AUTO: 8.62 K/UL (ref 3.9–11.3)

## 2024-08-08 PROCEDURE — 82947 ASSAY GLUCOSE BLOOD QUANT: CPT

## 2024-08-08 PROCEDURE — 93005 ELECTROCARDIOGRAM TRACING: CPT

## 2024-08-08 PROCEDURE — 70450 CT HEAD/BRAIN W/O DYE: CPT

## 2024-08-08 PROCEDURE — 70496 CT ANGIOGRAPHY HEAD: CPT

## 2024-08-08 PROCEDURE — 93306 TTE W/DOPPLER COMPLETE: CPT

## 2024-08-08 PROCEDURE — G0378 HOSPITAL OBSERVATION PER HR: HCPCS

## 2024-08-08 PROCEDURE — 70498 CT ANGIOGRAPHY NECK: CPT

## 2024-08-08 PROCEDURE — 84439 ASSAY OF FREE THYROXINE: CPT

## 2024-08-08 PROCEDURE — 99285 EMERGENCY DEPT VISIT HI MDM: CPT

## 2024-08-08 PROCEDURE — A9577 INJ MULTIHANCE: HCPCS

## 2024-08-08 PROCEDURE — 85610 PROTHROMBIN TIME: CPT

## 2024-08-08 PROCEDURE — 70553 MRI BRAIN STEM W/O & W/DYE: CPT

## 2024-08-08 PROCEDURE — 84484 ASSAY OF TROPONIN QUANT: CPT

## 2024-08-08 PROCEDURE — 84443 ASSAY THYROID STIM HORMONE: CPT

## 2024-08-08 PROCEDURE — 80053 COMPREHEN METABOLIC PANEL: CPT

## 2024-08-08 PROCEDURE — 82607 VITAMIN B-12: CPT

## 2024-08-08 PROCEDURE — 36415 COLL VENOUS BLD VENIPUNCTURE: CPT

## 2024-08-08 PROCEDURE — 83036 HEMOGLOBIN GLYCOSYLATED A1C: CPT

## 2024-08-08 PROCEDURE — 83735 ASSAY OF MAGNESIUM: CPT

## 2024-08-08 PROCEDURE — 80061 LIPID PANEL: CPT

## 2024-08-08 PROCEDURE — 71045 X-RAY EXAM CHEST 1 VIEW: CPT

## 2024-08-08 PROCEDURE — 85025 COMPLETE CBC W/AUTO DIFF WBC: CPT

## 2024-08-08 RX ADMIN — IOPAMIDOL ONE ML: 755 INJECTION, SOLUTION INTRAVENOUS at 23:30

## 2024-08-09 ENCOUNTER — HOSPITAL ENCOUNTER (OUTPATIENT)
Dept: HOSPITAL 56 - MW.SDS | Age: 54
Discharge: HOME | End: 2024-08-09
Attending: SURGERY
Payer: COMMERCIAL

## 2024-08-09 VITALS — HEART RATE: 52 BPM | SYSTOLIC BLOOD PRESSURE: 125 MMHG | DIASTOLIC BLOOD PRESSURE: 88 MMHG

## 2024-08-09 VITALS — DIASTOLIC BLOOD PRESSURE: 65 MMHG | HEART RATE: 47 BPM | SYSTOLIC BLOOD PRESSURE: 118 MMHG

## 2024-08-09 DIAGNOSIS — I63.9: ICD-10-CM

## 2024-08-09 DIAGNOSIS — G47.33: ICD-10-CM

## 2024-08-09 DIAGNOSIS — Z79.82: ICD-10-CM

## 2024-08-09 DIAGNOSIS — I10: ICD-10-CM

## 2024-08-09 DIAGNOSIS — K21.9: ICD-10-CM

## 2024-08-09 DIAGNOSIS — Z80.0: ICD-10-CM

## 2024-08-09 DIAGNOSIS — E66.9: ICD-10-CM

## 2024-08-09 DIAGNOSIS — Z84.81: ICD-10-CM

## 2024-08-09 DIAGNOSIS — Z79.899: ICD-10-CM

## 2024-08-09 DIAGNOSIS — Z12.11: Primary | ICD-10-CM

## 2024-08-09 LAB
ALBUMIN SERPL-MCNC: 3.7 G/DL (ref 3.4–5)
ALBUMIN/GLOB SERPL: 1.1 {RATIO} (ref 0.9–1.6)
ALP SERPL-CCNC: 69 U/L (ref 46–116)
ALT SERPL-CCNC: 116 IU/L (ref 14–63)
AST SERPL-CCNC: 54 IU/L (ref 15–37)
BASOPHILS # BLD AUTO: 0.03 K/UL (ref 0–0.2)
BASOPHILS NFR BLD AUTO: 0.4 % (ref 0–1)
BILIRUB SERPL-MCNC: 0.8 MG/DL (ref 0.2–1)
BUN SERPL-MCNC: 9 MG/DL (ref 7–18)
CALCIUM SERPL-MCNC: 9.2 MG/DL (ref 8.5–10.1)
CHLORIDE SERPL-SCNC: 103 MMOL/L (ref 98–107)
CHOLEST SERPL-MCNC: 117 MG/DL (ref 50–200)
CO2 SERPL-SCNC: 28 MMOL/L (ref 21–32)
CREAT CL 24H UR+SERPL-VRATE: 94.84 ML/MIN
CREAT SERPL-MCNC: 1 MG/DL (ref 0.8–1.3)
EGFRCR SERPLBLD CKD-EPI 2021: 91 ML/MIN (ref 60–?)
EOSINOPHIL # BLD AUTO: 0.14 K/UL (ref 0–0.45)
EOSINOPHIL NFR BLD AUTO: 1.8 % (ref 0–6)
GLOBULIN SER-MCNC: 3.4 G/DL (ref 2.6–4)
GLUCOSE SERPL-MCNC: 103 MG/DL (ref 74–106)
HBA1C BLD-MCNC: 5.7 %
HCT VFR BLD AUTO: 44.9 % (ref 42–52)
HDLC SERPL-MCNC: 23 MG/DL (ref 5–55)
HDLC SERPL-MCNC: 57 MG/DL (ref 40–60)
HGB BLD-MCNC: 15.2 G/DL (ref 14–18)
IMM GRANULOCYTES # BLD: 0.01 K/UL (ref 0–0.05)
IMM GRANULOCYTES NFR BLD: 0.1 % (ref 0–0.4)
LDLC SERPL CALC-MCNC: 36 MG/DL (ref 60–180)
LYMPHOCYTES # BLD AUTO: 2.51 K/UL (ref 1–4.8)
LYMPHOCYTES NFR BLD AUTO: 31.9 % (ref 24–44)
MAGNESIUM SERPL-MCNC: 1.9 MG/DL (ref 1.8–2.4)
MCH RBC QN AUTO: 31.5 PG (ref 28–32)
MCHC RBC AUTO-ENTMCNC: 33.9 G/DL (ref 32–36)
MCHC RBC AUTO-ENTMCNC: 93.2 FL (ref 83–99)
MONOCYTES # BLD AUTO: 0.89 K/UL (ref 0–0.8)
MONOCYTES NFR BLD AUTO: 11.3 % (ref 0–8)
NEUTROPHILS # BLD AUTO: 4.28 K/UL (ref 1.8–7.7)
NEUTROPHILS NFR BLD AUTO: 54.5 % (ref 41–71)
NRBC BLD AUTO-RTO: 0 /100WBC (ref 0–0.2)
NRBC BLD AUTO-RTO: 0 K/UL (ref 0–0.02)
PLATELET # BLD AUTO: 234 K/UL (ref 150–400)
PMV BLD AUTO: 10.4 FL (ref 9.4–12.4)
POTASSIUM SERPL-SCNC: 3.7 MMOL/L (ref 3.5–5.1)
PROT SERPL-MCNC: 7.1 G/DL (ref 6.4–8.2)
RBC # BLD AUTO: 4.82 M/UL (ref 4.52–5.9)
SODIUM SERPL-SCNC: 140 MMOL/L (ref 136–148)
T4 FREE SERPL-MCNC: 0.87 NG/DL (ref 0.76–1.46)
TRIGL SERPL-MCNC: 118 MG/DL (ref 0–200)
TSH SERPL DL<=0.005 MIU/L-ACNC: 4.17 UIU/ML (ref 0.36–3.74)
VIT B12 SERPL-MCNC: 712 PG/ML (ref 193–986)
WBC # BLD AUTO: 7.86 K/UL (ref 3.9–11.3)

## 2024-08-09 PROCEDURE — 45378 DIAGNOSTIC COLONOSCOPY: CPT

## 2024-08-09 RX ADMIN — SODIUM CHLORIDE, PRESERVATIVE FREE PRN ML: 5 INJECTION INTRAVENOUS at 02:00

## 2024-08-09 RX ADMIN — GADOBENATE DIMEGLUMINE ONE ML: 529 INJECTION, SOLUTION INTRAVENOUS at 08:03
